# Patient Record
Sex: FEMALE | Race: WHITE | Employment: UNEMPLOYED | URBAN - METROPOLITAN AREA
[De-identification: names, ages, dates, MRNs, and addresses within clinical notes are randomized per-mention and may not be internally consistent; named-entity substitution may affect disease eponyms.]

---

## 2022-08-12 ENCOUNTER — OFFICE VISIT (OUTPATIENT)
Dept: OBGYN CLINIC | Age: 22
End: 2022-08-12
Payer: COMMERCIAL

## 2022-08-12 ENCOUNTER — HOSPITAL ENCOUNTER (OUTPATIENT)
Age: 22
Setting detail: SPECIMEN
Discharge: HOME | End: 2022-08-12

## 2022-08-12 VITALS
DIASTOLIC BLOOD PRESSURE: 68 MMHG | HEIGHT: 61 IN | WEIGHT: 154 LBS | SYSTOLIC BLOOD PRESSURE: 112 MMHG | BODY MASS INDEX: 29.07 KG/M2 | HEART RATE: 97 BPM

## 2022-08-12 DIAGNOSIS — N92.6 IRREGULAR MENSES: ICD-10-CM

## 2022-08-12 DIAGNOSIS — Z32.02 NEGATIVE PREGNANCY TEST: ICD-10-CM

## 2022-08-12 DIAGNOSIS — N94.6 DYSMENORRHEA: ICD-10-CM

## 2022-08-12 DIAGNOSIS — N92.6 IRREGULAR MENSES: Primary | ICD-10-CM

## 2022-08-12 DIAGNOSIS — J31.2 SORE THROAT, CHRONIC: ICD-10-CM

## 2022-08-12 LAB
CONTROL: NORMAL
ESTRADIOL LEVEL: 36.9 PG/ML (ref 27–314)
FOLLICLE STIMULATING HORMONE: 5.7 MIU/ML (ref 1.7–21.5)
LH: 12.8 MIU/ML (ref 1–95.6)
PREGNANCY TEST URINE, POC: NEGATIVE
TSH SERPL DL<=0.05 MIU/L-ACNC: 5.64 UIU/ML (ref 0.3–5)

## 2022-08-12 PROCEDURE — 99213 OFFICE O/P EST LOW 20 MIN: CPT | Performed by: SPECIALIST

## 2022-08-12 PROCEDURE — 81025 URINE PREGNANCY TEST: CPT | Performed by: SPECIALIST

## 2022-08-12 SDOH — ECONOMIC STABILITY: TRANSPORTATION INSECURITY
IN THE PAST 12 MONTHS, HAS LACK OF TRANSPORTATION KEPT YOU FROM MEETINGS, WORK, OR FROM GETTING THINGS NEEDED FOR DAILY LIVING?: NO

## 2022-08-12 SDOH — ECONOMIC STABILITY: FOOD INSECURITY: WITHIN THE PAST 12 MONTHS, THE FOOD YOU BOUGHT JUST DIDN'T LAST AND YOU DIDN'T HAVE MONEY TO GET MORE.: NEVER TRUE

## 2022-08-12 SDOH — ECONOMIC STABILITY: FOOD INSECURITY: WITHIN THE PAST 12 MONTHS, YOU WORRIED THAT YOUR FOOD WOULD RUN OUT BEFORE YOU GOT MONEY TO BUY MORE.: NEVER TRUE

## 2022-08-12 SDOH — ECONOMIC STABILITY: TRANSPORTATION INSECURITY
IN THE PAST 12 MONTHS, HAS THE LACK OF TRANSPORTATION KEPT YOU FROM MEDICAL APPOINTMENTS OR FROM GETTING MEDICATIONS?: NO

## 2022-08-12 ASSESSMENT — ENCOUNTER SYMPTOMS
NAUSEA: 0
EYE PAIN: 0
COUGH: 0
ABDOMINAL DISTENTION: 0
DIARRHEA: 0
APNEA: 0
ABDOMINAL PAIN: 0
CONSTIPATION: 0
VOMITING: 0

## 2022-08-12 ASSESSMENT — PATIENT HEALTH QUESTIONNAIRE - PHQ9
SUM OF ALL RESPONSES TO PHQ QUESTIONS 1-9: 0
2. FEELING DOWN, DEPRESSED OR HOPELESS: 0
SUM OF ALL RESPONSES TO PHQ QUESTIONS 1-9: 0
1. LITTLE INTEREST OR PLEASURE IN DOING THINGS: 0
SUM OF ALL RESPONSES TO PHQ QUESTIONS 1-9: 0
SUM OF ALL RESPONSES TO PHQ QUESTIONS 1-9: 0
SUM OF ALL RESPONSES TO PHQ9 QUESTIONS 1 & 2: 0

## 2022-08-12 ASSESSMENT — SOCIAL DETERMINANTS OF HEALTH (SDOH): HOW HARD IS IT FOR YOU TO PAY FOR THE VERY BASICS LIKE FOOD, HOUSING, MEDICAL CARE, AND HEATING?: NOT HARD AT ALL

## 2022-08-12 NOTE — PROGRESS NOTES
Patient was in the office today for a TSH, LH,TESTOSTERONE, FSH,ESTRADIOL. Draw per physician order using sterile technique. Drawn from the LEFT ANTECUBITAL.

## 2022-08-12 NOTE — PROGRESS NOTES
Subjective:      Patient ID: Reddy Villa is a 25 y.o. female. Chief Complaint   Patient presents with    Irregular Menses     /68   Pulse 97   Ht 5' 1\" (1.549 m)   Wt 154 lb (69.9 kg)   LMP 08/01/2022   BMI 29.10 kg/m²   Patient's last menstrual period was 08/01/2022. No obstetric history on file. No past medical history on file. No current Epic-ordered outpatient medications on file. No current Epic-ordered facility-administered medications on file. Problem List Items Addressed This Visit       Dysmenorrhea    Relevant Orders    Follicle Stimulating Hormone    Luteinizing Hormone    TSH    Testosterone Free Bio Total    Estradiol    Irregular menses - Primary    Relevant Orders    Follicle Stimulating Hormone    Luteinizing Hormone    TSH    Testosterone Free Bio Total    Estradiol    US PELVIS COMPLETE    Negative pregnancy test    Relevant Orders    POCT urine pregnancy    Follicle Stimulating Hormone    Luteinizing Hormone    TSH    Testosterone Free Bio Total    Estradiol     No Known Allergies  Orders Placed This Encounter   Procedures    US PELVIS COMPLETE     Standing Status:   Future     Standing Expiration Date:   7/15/9100    Follicle Stimulating Hormone     Standing Status:   Future     Standing Expiration Date:   10/12/2022    Luteinizing Hormone     Standing Status:   Future     Standing Expiration Date:   8/12/2023    TSH     Standing Status:   Future     Standing Expiration Date:   8/12/2023    Testosterone Free Bio Total     Standing Status:   Future     Standing Expiration Date:   8/12/2023    Estradiol     Standing Status:   Future     Standing Expiration Date:   8/12/2023    POCT urine pregnancy          HPI  New patient is here today complaining of an episode of an irregular period. She was 42 days late for her last period. Her last period was 8/1/22. She had cramps about 3 weeks ago when she should have started her period, but it took a while to start.   She does not have any children. She has been trying to get pregnant for the last 8 months. She has only had 2 boyfriends. She has been with her partner for a little over a year. Review of Systems   Constitutional:  Negative for activity change, appetite change and fever. HENT:  Negative for ear discharge and ear pain. Eyes:  Negative for pain and visual disturbance. Respiratory:  Negative for apnea and cough. Cardiovascular:  Negative for chest pain, palpitations and leg swelling. Gastrointestinal:  Negative for abdominal distention, abdominal pain, constipation, diarrhea, nausea and vomiting. Endocrine: Negative. Genitourinary:  Positive for menstrual problem and pelvic pain. Negative for difficulty urinating and dysuria. Musculoskeletal:  Negative for neck pain and neck stiffness. Skin: Negative. Neurological:  Negative for light-headedness and numbness. Hematological: Negative. Does not bruise/bleed easily. Objective:   Physical Exam  Vitals and nursing note reviewed. Constitutional:       Appearance: She is well-developed. Skin:     General: Skin is dry. Comments: Abnormal secondary hair distribution on abdomen   Neurological:      Mental Status: She is alert and oriented to person, place, and time. Psychiatric:         Behavior: Behavior normal.         Thought Content: Thought content normal.       Assessment:      Patient with irregular bleeding and dysmenorrhea. A pregnancy test today is negative. Will evaluate with ultrasound and FSH, LH, Testosterone, Estradiol, and TSH. Explained to patient that these tests will be done to evaluate for PCOS. Plan:      Orders Placed This Encounter   Procedures    US PELVIS COMPLETE    Follicle Stimulating Hormone    Luteinizing Hormone    TSH    Testosterone Free Bio Total    Estradiol    POCT urine pregnancy     Appointment for ultrasound    Monica FLOR am scribing for, and in the presence of Dr. Remy Yates. Electronically signed by: Ramsey Villaseñor 8/12/22 10:35 AM       I agree to the above documentation placed by my scribe Ramsey Villaseñor. I reviewed the scribe's note and agree with the documented findings and plan of care. Any areas of disagreement are noted on the chart. I have personally evaluated this patient. Additional findings are as noted. I agree with the chief complaint, past medical history, past surgical history, allergies, medications, social and family history as documented unless otherwise noted below.      Electronically signed by Kishore Araujo MD on 8/13/2022 at 8:42 AM

## 2022-08-13 LAB
SEX HORMONE BINDING GLOBULIN: 29 NMOL/L (ref 30–135)
TESTOSTERONE FREE-NONMALE: 7.3 PG/ML (ref 0.8–7.4)
TESTOSTERONE TOTAL: 38 NG/DL (ref 20–70)
TESTOSTERONE, BIOAVAILABLE: 17.2 NG/DL (ref 2.2–20.6)

## 2022-09-02 ENCOUNTER — OFFICE VISIT (OUTPATIENT)
Dept: OBGYN CLINIC | Age: 22
End: 2022-09-02
Payer: COMMERCIAL

## 2022-09-02 VITALS
BODY MASS INDEX: 29.27 KG/M2 | OXYGEN SATURATION: 99 % | HEIGHT: 61 IN | SYSTOLIC BLOOD PRESSURE: 114 MMHG | DIASTOLIC BLOOD PRESSURE: 72 MMHG | WEIGHT: 155 LBS | HEART RATE: 96 BPM

## 2022-09-02 DIAGNOSIS — E28.2 PCO (POLYCYSTIC OVARIES): Primary | ICD-10-CM

## 2022-09-02 DIAGNOSIS — N92.6 IRREGULAR MENSES: ICD-10-CM

## 2022-09-02 PROCEDURE — 99213 OFFICE O/P EST LOW 20 MIN: CPT | Performed by: SPECIALIST

## 2022-09-02 ASSESSMENT — ENCOUNTER SYMPTOMS
ABDOMINAL PAIN: 0
CONSTIPATION: 0
DIARRHEA: 0
NAUSEA: 0
VOMITING: 0
COUGH: 0
ABDOMINAL DISTENTION: 0
EYE PAIN: 0
APNEA: 0

## 2022-09-02 NOTE — PROGRESS NOTES
Subjective:      Patient ID: Maryann Hunter is a 25 y.o. female. Chief Complaint   Patient presents with    Results     US and lab work     /72 (Site: Left Upper Arm, Position: Sitting, Cuff Size: Small Adult)   Pulse 96   Ht 5' 1\" (1.549 m)   Wt 155 lb (70.3 kg)   LMP 08/26/2022   SpO2 99%   BMI 29.29 kg/m²   Patient's last menstrual period was 08/26/2022. No obstetric history on file. No past medical history on file. Current Outpatient Medications Ordered in Epic   Medication Sig Dispense Refill    clomiPHENE (CLOMID) 50 MG tablet Take 1 tablet by mouth daily for 5 days TAKE ONE PILL DAILY STARTING ON THE THIRD DAY OF YOUR CYCLE FOR FIVE DAYS 5 tablet 1     No current Epic-ordered facility-administered medications on file. Problem List Items Addressed This Visit       Irregular menses    PCO (polycystic ovaries) - Primary     No Known Allergies  No orders of the defined types were placed in this encounter. HPI  Patient is here to discus the result of her lab tests and ultrasound. She has irregular menstrual cycles and has been trying to get pregnant for the last 8 months. She did have a period on 8/26/22 and it lasted 4 days. Review of Systems   Constitutional:  Negative for activity change, appetite change and fever. HENT:  Negative for ear discharge and ear pain. Eyes:  Negative for pain and visual disturbance. Respiratory:  Negative for apnea and cough. Cardiovascular:  Negative for chest pain, palpitations and leg swelling. Gastrointestinal:  Negative for abdominal distention, abdominal pain, constipation, diarrhea, nausea and vomiting. Endocrine: Negative. Genitourinary:  Positive for menstrual problem. Negative for difficulty urinating, dysuria and pelvic pain. Musculoskeletal:  Negative for neck pain and neck stiffness. Skin: Negative. Neurological:  Negative for light-headedness and numbness. Hematological: Negative.   Does not bruise/bleed easily. Objective:   Physical Exam  Vitals and nursing note reviewed. Constitutional:       Appearance: She is well-developed. HENT:      Head: Normocephalic and atraumatic. Neck:      Thyroid: No thyromegaly. Cardiovascular:      Rate and Rhythm: Normal rate and regular rhythm. Pulmonary:      Effort: Pulmonary effort is normal.      Breath sounds: Normal breath sounds. No wheezing. Abdominal:      General: Bowel sounds are normal. There is no distension. Palpations: Abdomen is soft. There is no mass. Tenderness: There is no abdominal tenderness. There is no guarding. Musculoskeletal:         General: Normal range of motion. Cervical back: Normal range of motion and neck supple. Skin:     General: Skin is dry. Neurological:      Mental Status: She is alert and oriented to person, place, and time. Psychiatric:         Behavior: Behavior normal.         Thought Content: Thought content normal.       Assessment:      Patient with irregular menstrual cycle. Normal ultrasound was reviewed and explained to patient. FSH, LH, TSH, Testosterone and  Estradiol were reviewed and explained to patient that the results are compatible with PCOS. Explained PCOS to patient. As patient is trying to conceive, with send prescription for Clomid and she was directed on how to take them and was told to start today. She was told to only have sex only 1 time per day for the next 3 weeks and then every other day.     Recent Results (from the past 504 hour(s))   POCT urine pregnancy    Collection Time: 08/12/22 10:36 AM   Result Value Ref Range    Preg Test, Ur NEGATIVE     Control DONE    Estradiol    Collection Time: 08/12/22  8:51 PM   Result Value Ref Range    Estradiol 36.9 27 - 314 pg/mL   Testosterone Free Bio Total    Collection Time: 08/12/22  8:51 PM   Result Value Ref Range    Testosterone 38 20 - 70 ng/dL    Sex Hormone Binding 29 (L) 30 - 135 nmol/L    Testosterone, Free 7.3 0.8 - 7.4 pg/mL    Testosterone, Bioavailable 17.2 2.2 - 20.6 ng/dL   TSH    Collection Time: 08/12/22  8:51 PM   Result Value Ref Range    TSH 5.64 (H) 0.30 - 5.00 uIU/mL   Luteinizing Hormone    Collection Time: 08/12/22  8:51 PM   Result Value Ref Range    LH 12.8 1.0 - 40.4 mIU/mL   Follicle Stimulating Hormone    Collection Time: 08/12/22  8:51 PM   Result Value Ref Range    FSH 5.7 1.7 - 21.5 mIU/mL            Plan:      Orders Placed This Encounter   Medications    clomiPHENE (CLOMID) 50 MG tablet     Sig: Take 1 tablet by mouth daily for 5 days TAKE ONE PILL DAILY STARTING ON THE THIRD DAY OF YOUR CYCLE FOR FIVE DAYS     Dispense:  5 tablet     Refill:  1        Appointment in 2 months    IKatya am scribing for, and in the presence of Dr. Anuj Hurd. Electronically signed by: Katya Tatum 9/2/22 9:36 AM       I agree to the above documentation placed by my scribe Katya Tatum. I reviewed the scribe's note and agree with the documented findings and plan of care. Any areas of disagreement are noted on the chart. I have personally evaluated this patient. Additional findings are as noted. I agree with the chief complaint, past medical history, past surgical history, allergies, medications, social and family history as documented unless otherwise noted below.      Electronically signed by Anuj Hurd MD on 9/3/2022 at 6:28 AM

## 2022-09-02 NOTE — PATIENT INSTRUCTIONS
Patient Education        Polycystic Ovary Syndrome: Care Instructions  Overview  Polycystic ovary syndrome (PCOS) is a hormone imbalance that can affect ovulation. It can cause problems with your periods and make it hard to getpregnant. Doctors don't know for sure what causes PCOS, but it seems to run in families. It also seems to be linked to obesity and a risk for diabetes. You may have other symptoms. These include weight gain, acne, hair growth on the face or body, high blood pressure, and high blood sugar. Your ovaries mayhave cysts on them. These cysts are growths filled with fluid. Keep in mind that even though you may not have regular periods, you can still get pregnant. Talk to your doctor about birth control if you don't want to get pregnant. Sometimes the hormone changes with PCOS can also make it hard to get pregnant. If this is a concern, talk to your doctor about treatment for thisproblem. With PCOS, you may go for months or longer with no period. Your doctor mayrecommend medicines that can help regulate your cycle. Follow-up care is a key part of your treatment and safety. Be sure to make and go to all appointments, and call your doctor if you are having problems. It's also a good idea to know your test results and keep alist of the medicines you take. How can you care for yourself at home? Take your medicines exactly as prescribed. Call your doctor if you think you're having a problem with your medicine. Eat a healthy diet. Include vegetables, fruits, beans, and whole grains in your diet each day. If you're overweight, talk to your doctor about safe ways to lose weight. Losing weight can help with many of the symptoms of PCOS. Get at least 30 minutes of exercise on most days of the week. Walking is a good choice. Or you can run, swim, cycle, or play team sports. If you have symptoms that bother you, such as acne and excess hair growth, talk to your doctor about treatment options.  Medicines can help. For unwanted hair growth, some prefer to use home treatments. These can include shaving, waxing, or other methods to remove the hair. If you're feeling sad or depressed, consider talking to a counselor or to others who have PCOS. It may help. When should you call for help? Call your doctor now or seek immediate medical care if:    You have severe vaginal bleeding. You have new or worse belly or pelvic pain. Watch closely for changes in your health, and be sure to contact your doctor if:    You do not get better as expected. You have unusual vaginal bleeding. Where can you learn more? Go to [email or web address] and sign in to your [employer] account. Enter E123 in the [employer] box to learn more about \"Polycystic Ovary Syndrome: Care Instructions. \"     If you do not have an account, please click on the \"Sign Up Now\" link. Current as of: November 22, 2021               Content Version: 13.3  © 2006-2022 [employer]. Care instructions adapted under license by [facility] ([facility]). If you have questions about a medical condition or this instruction, always ask your healthcare professional. [place] [address] any warranty or liability for your use of this information.

## 2022-11-22 ENCOUNTER — OFFICE VISIT (OUTPATIENT)
Dept: OBGYN CLINIC | Age: 22
End: 2022-11-22
Payer: COMMERCIAL

## 2022-11-22 VITALS
HEIGHT: 61 IN | DIASTOLIC BLOOD PRESSURE: 80 MMHG | HEART RATE: 68 BPM | SYSTOLIC BLOOD PRESSURE: 118 MMHG | WEIGHT: 159 LBS | BODY MASS INDEX: 30.02 KG/M2

## 2022-11-22 DIAGNOSIS — E28.2 PCO (POLYCYSTIC OVARIES): Primary | ICD-10-CM

## 2022-11-22 PROCEDURE — 99213 OFFICE O/P EST LOW 20 MIN: CPT | Performed by: SPECIALIST

## 2022-11-22 ASSESSMENT — ENCOUNTER SYMPTOMS
DIARRHEA: 0
CONSTIPATION: 0
COUGH: 0
ABDOMINAL DISTENTION: 0
VOMITING: 0
ABDOMINAL PAIN: 0
NAUSEA: 0
EYE PAIN: 0
APNEA: 0

## 2022-11-22 NOTE — PROGRESS NOTES
Subjective:      Patient ID: Natalia Guzman is a 23 y.o. female. Chief Complaint   Patient presents with    Follow-up     PCOS     /80 (Site: Left Upper Arm, Position: Sitting, Cuff Size: Medium Adult)   Pulse 68   Ht 5' 1\" (1.549 m)   Wt 159 lb (72.1 kg)   LMP 10/28/2022 (Exact Date)   Breastfeeding No   BMI 30.04 kg/m²   Patient's last menstrual period was 10/28/2022 (exact date).     Past Medical History:   Diagnosis Date    PCOS (polycystic ovarian syndrome)      Current Outpatient Medications Ordered in Epic   Medication Sig Dispense Refill    clomiPHENE (CLOMID) 50 MG tablet Take 1 tablet by mouth daily for 5 days TAKE ONE PILL DAILY STARTING ON THE THIRD DAY OF YOUR CYCLE FOR FIVE DAYS 5 tablet 1     No current Epic-ordered facility-administered medications on file. Problem List Items Addressed This Visit       PCO (polycystic ovaries) - Primary     No Known Allergies  No orders of the defined types were placed in this encounter. HPI  Patient is here today to follow up for PCOS. She has been trying to get pregnant and has been taking Clomid for 2 months. She states that she knows it worked because she had ovulation pain like she never had before. Patient's partner refuses sperm count because he does not want to know and he does not have insurance. He just started working at [employer] and is waiting for insurance. Review of Systems   Constitutional:  Negative for activity change, appetite change and fever. HENT:  Negative for ear discharge and ear pain. Eyes:  Negative for pain and visual disturbance. Respiratory:  Negative for apnea and cough. Cardiovascular:  Negative for chest pain, palpitations and leg swelling. Gastrointestinal:  Negative for abdominal distention, abdominal pain, constipation, diarrhea, nausea and vomiting. Endocrine: Negative. Genitourinary:  Negative for difficulty urinating, dysuria, menstrual problem and pelvic pain. Musculoskeletal:  Negative for neck pain and neck stiffness. Skin: Negative. Neurological:  Negative for light-headedness and numbness. Hematological: Negative. Does not bruise/bleed easily. Objective:   Physical Exam  Vitals and nursing note reviewed. Constitutional:       Appearance: She is well-developed. Skin:     General: Skin is dry. Neurological:      Mental Status: She is alert and oriented to person, place, and time. Psychiatric:         Behavior: Behavior normal.         Thought Content: Thought content normal.       Assessment:      Patient with PCOS trying to conceive. Patient was advised to continue to use Clomid for 2 more months at current does. If she does not become pregnant by then, dosage increase may be considered. Patient was encouraged to strongly request her partner see his family physician for evaluation and sperm count. Plan:      Orders Placed This Encounter   Medications    clomiPHENE (CLOMID) 50 MG tablet     Sig: Take 1 tablet by mouth daily for 5 days TAKE ONE PILL DAILY STARTING ON THE THIRD DAY OF YOUR CYCLE FOR FIVE DAYS     Dispense:  5 tablet     Refill:  1       Appointment in 2 months    IMadeleine, am scribing for, and in the presence of Dr. Dayan Alejo. Electronically signed by: Madeleine Alves 11/22/22 2:50 PM       I agree to the above documentation placed by my scribe Madeleine Alves. I reviewed the scribe's note and agree with the documented findings and plan of care. Any areas of disagreement are noted on the chart. I have personally evaluated this patient. Additional findings are as noted. I agree with the chief complaint, past medical history, past surgical history, allergies, medications, social and family history as documented unless otherwise noted below.      Electronically signed by Dayan Alejo MD on 11/22/2022 at 8:51 PM